# Patient Record
Sex: FEMALE | Race: WHITE | NOT HISPANIC OR LATINO | Employment: FULL TIME | URBAN - METROPOLITAN AREA
[De-identification: names, ages, dates, MRNs, and addresses within clinical notes are randomized per-mention and may not be internally consistent; named-entity substitution may affect disease eponyms.]

---

## 2023-05-01 ENCOUNTER — TRANSCRIBE ORDERS (OUTPATIENT)
Dept: OBSTETRICS AND GYNECOLOGY | Facility: HOSPITAL | Age: 28
End: 2023-05-01
Payer: COMMERCIAL

## 2023-05-01 DIAGNOSIS — Z34.90 PREGNANCY, UNSPECIFIED GESTATIONAL AGE: ICD-10-CM

## 2023-05-01 DIAGNOSIS — O28.3 ABNORMAL FETAL ULTRASOUND: Primary | ICD-10-CM

## 2023-05-01 DIAGNOSIS — O28.3 ECHOGENIC BOWEL OF FETUS ON PRENATAL ULTRASOUND: ICD-10-CM

## 2023-05-17 ENCOUNTER — OFFICE VISIT (OUTPATIENT)
Dept: OBSTETRICS AND GYNECOLOGY | Facility: HOSPITAL | Age: 28
End: 2023-05-17
Payer: COMMERCIAL

## 2023-05-17 ENCOUNTER — HOSPITAL ENCOUNTER (OUTPATIENT)
Dept: WOMENS IMAGING | Facility: HOSPITAL | Age: 28
Discharge: HOME | End: 2023-05-17
Admitting: OBSTETRICS & GYNECOLOGY
Payer: COMMERCIAL

## 2023-05-17 VITALS
DIASTOLIC BLOOD PRESSURE: 52 MMHG | BODY MASS INDEX: 21.79 KG/M2 | SYSTOLIC BLOOD PRESSURE: 85 MMHG | WEIGHT: 123 LBS | HEIGHT: 63 IN

## 2023-05-17 DIAGNOSIS — Z34.90 PREGNANCY, UNSPECIFIED GESTATIONAL AGE: ICD-10-CM

## 2023-05-17 DIAGNOSIS — O28.3 ABNORMAL FETAL ULTRASOUND: ICD-10-CM

## 2023-05-17 DIAGNOSIS — O28.3 ECHOGENIC BOWEL OF FETUS ON PRENATAL ULTRASOUND: ICD-10-CM

## 2023-05-17 DIAGNOSIS — O28.3 ECHOGENIC BOWEL OF FETUS ON PRENATAL ULTRASOUND: Primary | ICD-10-CM

## 2023-05-17 PROCEDURE — 76811 OB US DETAILED SNGL FETUS: CPT

## 2023-05-17 RX ORDER — ONDANSETRON 4 MG/1
4 TABLET, FILM COATED ORAL EVERY 8 HOURS PRN
COMMUNITY

## 2023-05-17 RX ORDER — PROMETHAZINE HYDROCHLORIDE 12.5 MG/1
12.5 TABLET ORAL EVERY 6 HOURS PRN
COMMUNITY

## 2023-05-17 RX ORDER — PRENATAL VIT/IRON FUM/FOLIC AC 27MG-0.8MG
TABLET ORAL DAILY
COMMUNITY

## 2023-05-17 NOTE — LETTER
"May 17, 2023       No Recipients    Patient: Juliane Harmon   YOB: 1995   Date of Visit: 2023       Dear Ivette Pichardo DO,    Thank you for referring Juliane Harmon to me for evaluation. Below is a copy of my consult note.    If you have questions, please do not hesitate to call me. I look forward to following Juliane along with you.         Sincerely,        Douglas A. Milligan, MD        CC:   No Recipients    Patient without complaints  Patient next follow up with Dr. Pichardo is 2023  NIPT Negative/female    Documentation of the ultrasound findings, images, and interpretations will be available in the patient's Viewpoint report which is located in the imaging tab in chart review.      Maternal/Fetal Medicine Consult Note     Name: Juliane Harmon    : 1995     MRN: [id number]     Referring Provider: Ivette Pichardo DO    Chief Complaint  echo bowel    Subjective     History of Present Illness:  Juliane Harmon is a 27 y.o.  23w3d who presents today for echogenic fetal bowel    LOVELY: Estimated Date of Delivery: 9/10/23     ROS:   As noted in HPI.     History reviewed. No pertinent past medical history.   Past Surgical History:   Procedure Laterality Date   • DILATATION AND CURETTAGE  2018    D&C x 2 for retained placenta      OB History          4    Para   3    Term   2       1    AB   0    Living   3         SAB   0    IAB   0    Ectopic   0    Molar   0    Multiple   0    Live Births   3                Objective     Vital Signs  BP (!) 85/52   Ht 158.8 cm (62.5\")   Wt 55.8 kg (123 lb)   Estimated body mass index is 22.14 kg/m² as calculated from the following:    Height as of this encounter: 158.8 cm (62.5\").    Weight as of this encounter: 55.8 kg (123 lb).    Physical Exam    Ultrasound Impression:   See viewpoint    Assessment and Plan     Juliane Harmon is a 27 y.o.  23w3d who presents today for echogenic fetal bowel    Diagnoses and " all orders for this visit:    1. Echogenic bowel of fetus on prenatal ultrasound (Primary)  Assessment & Plan:  Echogenic bowel is a nonspecific finding observed in 0.2% to 1.8% of routine 2nd trimester ultrasound exams.  The diagnosis is made when the fetal bowel displays echogenicity or brightness equal to or greater than that of the surrounding fetal bone.    Although echogenic bowel can be a transient-idiopathic finding, it can also be associated with a wide range of pathologic conditions.  In most cases, the increased echogenicity is thought to be due to highly viscous meconium within the small bowel caused by obstruction (meconium ileus), poor bowel motility or abnormal pancreatic enzyme secretion.  Calcifications of bowel have also been associated with fetal infections such as toxoplasmosis or cytomegalovirus, although the pathophysiology of this association is poorly understood.  Focal areas of bowel echogenicity have also been attributed to areas of ischemia.  Further, the fetal bowel may appear echogenic because of intrauterine swallowing of amniotic fluid containing blood which is extremely echogenic.      The estimated incidence of aneuploidy in fetuses with isolated echogenic bowel ranges from 3% to 5%, with trisomy 21 being the most commonly diagnosed.  For pregnant people with no previous aneuploidy screening and isolated fetal echogenic bowel, ACOG and SM[facility] recommend discussion of options for noninvasive aneuploidy screening with cfDNA or quad screen if cfDNA is unavailable or cost-prohibitive ([id number]).  Although symptomatic maternal infection is uncommon, a history should be taken to evaluate for possible timing of symptoms of CMV.  CMV immunoglobulin G (IgG) and IgM titers should be drawn regardless, with IgG avidity testing as applicable.  For fetuses with isolated echogenic bowel, ACOG and SMFM also recommend evaluation for cystic fibrosis.      Finally, isolated echogenic bowel is associated  with fetal growth restriction with an odds ratio of 2.  The pathophysiology of this finding is presumably due to areas of ischemia resulting from the redistribution of blood flow away from the gut.  Because of this association, a third-trimester ultrasound examination for reassessment and evaluation of fetal growth for all fetuses with isolated echogenic bowel is recommended.      On ultrasound today the bowel is noted but does not appear as bright as fetal bone and consequently should not be considered echogenic bowel.  As such I do not believe patient is a significant increased risk for anomalies.  She has already had a cell free DNA test making her risk for trisomy 21 very low.  In reviewing her records it appears she has not had cystic fibrosis screening.  I discussed the option of cystic fibrosis screening with the patient and if she desires that she will ask her local obstetrician for the test.        2. Pregnancy, unspecified gestational age       Follow Up  No follow-ups on file.    I spent 15 minutes caring for the patient on the day of service. This included: obtaining or reviewing a separately obtained medical history, reviewing patient records, performing a medically appropriate exam and/or evaluation, counseling or educating the patient/family/caregiver, ordering medications, labs, and/or procedures and documenting such in the medical record. This does not include time spent on review and interpretation of other tests such as fetal ultrasound or the performance of other procedures such as amniocentesis or CVS.      Douglas A. Milligan, MD  Maternal Fetal Medicine, [facility]     2023

## 2023-05-17 NOTE — PROGRESS NOTES
Patient without complaints  Patient next follow up with Dr. Pichardo is 6/14/2023  NIPT Negative/female

## 2023-05-17 NOTE — PROGRESS NOTES
"Documentation of the ultrasound findings, images, and interpretations will be available in the patient's Viewpoint report which is located in the imaging tab in chart review.      Maternal/Fetal Medicine Consult Note     Name: Juliane Harmon    : 1995     MRN: [id number]     Referring Provider: Ivette Pichardo DO    Chief Complaint  echo bowel    Subjective     History of Present Illness:  Juliane Harmon is a 27 y.o.  23w3d who presents today for echogenic fetal bowel    LOVELY: Estimated Date of Delivery: 9/10/23     ROS:   As noted in HPI.     History reviewed. No pertinent past medical history.   Past Surgical History:   Procedure Laterality Date   • DILATATION AND CURETTAGE      D&C x 2 for retained placenta      OB History        4    Para   3    Term   2       1    AB   0    Living   3       SAB   0    IAB   0    Ectopic   0    Molar   0    Multiple   0    Live Births   3                Objective     Vital Signs  BP (!) 85/52   Ht 158.8 cm (62.5\")   Wt 55.8 kg (123 lb)   Estimated body mass index is 22.14 kg/m² as calculated from the following:    Height as of this encounter: 158.8 cm (62.5\").    Weight as of this encounter: 55.8 kg (123 lb).    Physical Exam    Ultrasound Impression:   See viewpoint    Assessment and Plan     Juliane Harmon is a 27 y.o.  23w3d who presents today for echogenic fetal bowel    Diagnoses and all orders for this visit:    1. Echogenic bowel of fetus on prenatal ultrasound (Primary)  Assessment & Plan:  Echogenic bowel is a nonspecific finding observed in 0.2% to 1.8% of routine 2nd trimester ultrasound exams.  The diagnosis is made when the fetal bowel displays echogenicity or brightness equal to or greater than that of the surrounding fetal bone.    Although echogenic bowel can be a transient-idiopathic finding, it can also be associated with a wide range of pathologic conditions.  In most cases, the increased echogenicity is thought " to be due to highly viscous meconium within the small bowel caused by obstruction (meconium ileus), poor bowel motility or abnormal pancreatic enzyme secretion.  Calcifications of bowel have also been associated with fetal infections such as toxoplasmosis or cytomegalovirus, although the pathophysiology of this association is poorly understood.  Focal areas of bowel echogenicity have also been attributed to areas of ischemia.  Further, the fetal bowel may appear echogenic because of intrauterine swallowing of amniotic fluid containing blood which is extremely echogenic.      The estimated incidence of aneuploidy in fetuses with isolated echogenic bowel ranges from 3% to 5%, with trisomy 21 being the most commonly diagnosed.  For pregnant people with no previous aneuploidy screening and isolated fetal echogenic bowel, ACOG and SM[facility] recommend discussion of options for noninvasive aneuploidy screening with cfDNA or quad screen if cfDNA is unavailable or cost-prohibitive ([id number]).  Although symptomatic maternal infection is uncommon, a history should be taken to evaluate for possible timing of symptoms of CMV.  CMV immunoglobulin G (IgG) and IgM titers should be drawn regardless, with IgG avidity testing as applicable.  For fetuses with isolated echogenic bowel, ACOG and SMFM also recommend evaluation for cystic fibrosis.      Finally, isolated echogenic bowel is associated with fetal growth restriction with an odds ratio of 2.  The pathophysiology of this finding is presumably due to areas of ischemia resulting from the redistribution of blood flow away from the gut.  Because of this association, a third-trimester ultrasound examination for reassessment and evaluation of fetal growth for all fetuses with isolated echogenic bowel is recommended.      On ultrasound today the bowel is noted but does not appear as bright as fetal bone and consequently should not be considered echogenic bowel.  As such I do not believe  patient is a significant increased risk for anomalies.  She has already had a cell free DNA test making her risk for trisomy 21 very low.  In reviewing her records it appears she has not had cystic fibrosis screening.  I discussed the option of cystic fibrosis screening with the patient and if she desires that she will ask her local obstetrician for the test.        2. Pregnancy, unspecified gestational age       Follow Up  No follow-ups on file.    I spent 15 minutes caring for the patient on the day of service. This included: obtaining or reviewing a separately obtained medical history, reviewing patient records, performing a medically appropriate exam and/or evaluation, counseling or educating the patient/family/caregiver, ordering medications, labs, and/or procedures and documenting such in the medical record. This does not include time spent on review and interpretation of other tests such as fetal ultrasound or the performance of other procedures such as amniocentesis or CVS.      Douglas A. Milligan, MD  Maternal Fetal Medicine, [facility]     2023

## 2023-05-17 NOTE — ASSESSMENT & PLAN NOTE
Echogenic bowel is a nonspecific finding observed in 0.2% to 1.8% of routine 2nd trimester ultrasound exams.  The diagnosis is made when the fetal bowel displays echogenicity or brightness equal to or greater than that of the surrounding fetal bone.    Although echogenic bowel can be a transient-idiopathic finding, it can also be associated with a wide range of pathologic conditions.  In most cases, the increased echogenicity is thought to be due to highly viscous meconium within the small bowel caused by obstruction (meconium ileus), poor bowel motility or abnormal pancreatic enzyme secretion.  Calcifications of bowel have also been associated with fetal infections such as toxoplasmosis or cytomegalovirus, although the pathophysiology of this association is poorly understood.  Focal areas of bowel echogenicity have also been attributed to areas of ischemia.  Further, the fetal bowel may appear echogenic because of intrauterine swallowing of amniotic fluid containing blood which is extremely echogenic.      The estimated incidence of aneuploidy in fetuses with isolated echogenic bowel ranges from 3% to 5%, with trisomy 21 being the most commonly diagnosed.  For pregnant people with no previous aneuploidy screening and isolated fetal echogenic bowel, ACOG and SMFM recommend discussion of options for noninvasive aneuploidy screening with cfDNA or quad screen if cfDNA is unavailable or cost-prohibitive ([id number]).  Although symptomatic maternal infection is uncommon, a history should be taken to evaluate for possible timing of symptoms of CMV.  CMV immunoglobulin G (IgG) and IgM titers should be drawn regardless, with IgG avidity testing as applicable.  For fetuses with isolated echogenic bowel, ACOG and SMFM also recommend evaluation for cystic fibrosis.      Finally, isolated echogenic bowel is associated with fetal growth restriction with an odds ratio of 2.  The pathophysiology of this finding is presumably due to  areas of ischemia resulting from the redistribution of blood flow away from the gut.  Because of this association, a third-trimester ultrasound examination for reassessment and evaluation of fetal growth for all fetuses with isolated echogenic bowel is recommended.      On ultrasound today the bowel is noted but does not appear as bright as fetal bone and consequently should not be considered echogenic bowel.  As such I do not believe patient is a significant increased risk for anomalies.  She has already had a cell free DNA test making her risk for trisomy 21 very low.  In reviewing her records it appears she has not had cystic fibrosis screening.  I discussed the option of cystic fibrosis screening with the patient and if she desires that she will ask her local obstetrician for the test.